# Patient Record
Sex: FEMALE | Race: WHITE
[De-identification: names, ages, dates, MRNs, and addresses within clinical notes are randomized per-mention and may not be internally consistent; named-entity substitution may affect disease eponyms.]

---

## 2017-09-29 ENCOUNTER — HOSPITAL ENCOUNTER (EMERGENCY)
Dept: HOSPITAL 83 - ED | Age: 22
Discharge: HOME | End: 2017-09-29
Payer: COMMERCIAL

## 2017-09-29 VITALS — HEIGHT: 55 IN | WEIGHT: 110 LBS

## 2017-09-29 DIAGNOSIS — L02.01: Primary | ICD-10-CM

## 2017-09-29 DIAGNOSIS — Z88.8: ICD-10-CM

## 2018-01-17 ENCOUNTER — HOSPITAL ENCOUNTER (EMERGENCY)
Dept: HOSPITAL 83 - ED | Age: 23
Discharge: HOME | End: 2018-01-17
Payer: COMMERCIAL

## 2018-01-17 VITALS — WEIGHT: 100 LBS | HEIGHT: 60 IN | BODY MASS INDEX: 19.63 KG/M2

## 2018-01-17 DIAGNOSIS — Z88.8: ICD-10-CM

## 2018-01-17 DIAGNOSIS — K52.9: Primary | ICD-10-CM

## 2018-01-17 LAB
ALBUMIN SERPL-MCNC: 3.9 GM/DL (ref 3.1–4.5)
ALP SERPL-CCNC: 95 U/L (ref 45–117)
ALT SERPL W P-5'-P-CCNC: 30 U/L (ref 12–78)
AST SERPL-CCNC: 27 IU/L (ref 3–35)
BASOPHILS # BLD AUTO: 0 10*3/UL (ref 0–0.1)
BASOPHILS NFR BLD AUTO: 0.4 % (ref 0–1)
BUN SERPL-MCNC: 22 MG/DL (ref 7–24)
CHLORIDE SERPL-SCNC: 104 MMOL/L (ref 98–107)
CREAT SERPL-MCNC: 0.8 MG/DL (ref 0.55–1.02)
EOSINOPHIL # BLD AUTO: 0.2 10*3/UL (ref 0–0.4)
EOSINOPHIL # BLD AUTO: 2 % (ref 1–4)
ERYTHROCYTE [DISTWIDTH] IN BLOOD BY AUTOMATED COUNT: 12.3 % (ref 0–14.5)
HCT VFR BLD AUTO: 39.7 % (ref 37–47)
HGB BLD-MCNC: 13.6 G/DL (ref 12–16)
LIPASE SERPL-CCNC: 80 U/L (ref 73–393)
LYMPHOCYTES # BLD AUTO: 1.6 10*3/UL (ref 1.3–4.4)
LYMPHOCYTES NFR BLD AUTO: 18.2 % (ref 27–41)
MCH RBC QN AUTO: 30.8 PG (ref 27–31)
MCHC RBC AUTO-ENTMCNC: 34.3 G/DL (ref 33–37)
MCV RBC AUTO: 90 FL (ref 81–99)
MONOCYTES # BLD AUTO: 0.9 10*3/UL (ref 0.1–1)
MONOCYTES NFR BLD MANUAL: 10.4 % (ref 3–9)
NEUT #: 5.9 10*3/UL (ref 2.3–7.9)
NEUT %: 68.6 % (ref 47–73)
NRBC BLD QL AUTO: 0 10*3/UL (ref 0–0)
PLATELET # BLD AUTO: 235 10*3/UL (ref 130–400)
PMV BLD AUTO: 9.3 FL (ref 9.6–12.3)
POTASSIUM SERPL-SCNC: 4.1 MMOL/L (ref 3.5–5.1)
PROT SERPL-MCNC: 7.5 GM/DL (ref 6.4–8.2)
RBC # BLD AUTO: 4.41 10*6/UL (ref 4.1–5.1)
SODIUM SERPL-SCNC: 139 MMOL/L (ref 136–145)
WBC NRBC COR # BLD AUTO: 8.6 10*3/UL (ref 4.8–10.8)

## 2018-05-23 ENCOUNTER — HOSPITAL ENCOUNTER (EMERGENCY)
Dept: HOSPITAL 83 - ED | Age: 23
Discharge: HOME | End: 2018-05-23
Payer: COMMERCIAL

## 2018-05-23 VITALS — BODY MASS INDEX: 20.62 KG/M2 | HEIGHT: 60 IN | WEIGHT: 105 LBS

## 2018-05-23 DIAGNOSIS — B34.9: Primary | ICD-10-CM

## 2018-05-23 DIAGNOSIS — R53.1: ICD-10-CM

## 2018-05-23 DIAGNOSIS — Z88.8: ICD-10-CM

## 2018-05-23 DIAGNOSIS — R42: ICD-10-CM

## 2018-05-23 LAB
APPEARANCE UR: CLEAR
BACTERIA #/AREA URNS HPF: (no result) /[HPF]
BILIRUB UR QL STRIP: NEGATIVE
COLOR UR: YELLOW
GLUCOSE UR QL: NEGATIVE
HGB UR QL STRIP: NEGATIVE
KETONES UR QL STRIP: NEGATIVE
LEUKOCYTE ESTERASE UR QL STRIP: NEGATIVE
NITRITE UR QL STRIP: NEGATIVE
PH UR STRIP: 5.5 [PH] (ref 5–9)
RBC #/AREA URNS HPF: (no result) RBC/HPF (ref 0–2)
SP GR UR: 1.01 (ref 1–1.03)
UROBILINOGEN UR STRIP-MCNC: 0.2 E.U./DL (ref 0.2–1)
WBC #/AREA URNS HPF: (no result) WBC/HPF (ref 0–5)

## 2018-06-18 ENCOUNTER — HOSPITAL ENCOUNTER (EMERGENCY)
Dept: HOSPITAL 83 - ED | Age: 23
Discharge: HOME | End: 2018-06-18
Payer: COMMERCIAL

## 2018-06-18 VITALS — BODY MASS INDEX: 21.4 KG/M2 | WEIGHT: 109 LBS | HEIGHT: 60 IN

## 2018-06-18 DIAGNOSIS — L30.8: Primary | ICD-10-CM

## 2018-06-18 DIAGNOSIS — Z88.8: ICD-10-CM

## 2018-07-14 ENCOUNTER — HOSPITAL ENCOUNTER (EMERGENCY)
Dept: HOSPITAL 83 - ED | Age: 23
Discharge: HOME | End: 2018-07-14
Payer: COMMERCIAL

## 2018-07-14 VITALS — HEIGHT: 55 IN | WEIGHT: 109 LBS

## 2018-07-14 DIAGNOSIS — Z88.8: ICD-10-CM

## 2018-07-14 DIAGNOSIS — L30.9: Primary | ICD-10-CM

## 2018-07-14 DIAGNOSIS — Z79.899: ICD-10-CM

## 2019-03-18 ENCOUNTER — HOSPITAL ENCOUNTER (EMERGENCY)
Dept: HOSPITAL 83 - ED | Age: 24
Discharge: HOME | End: 2019-03-18
Payer: COMMERCIAL

## 2019-03-18 VITALS — HEIGHT: 60 IN | WEIGHT: 114 LBS | BODY MASS INDEX: 22.38 KG/M2

## 2019-03-18 DIAGNOSIS — Z88.8: ICD-10-CM

## 2019-03-18 DIAGNOSIS — R20.0: ICD-10-CM

## 2019-03-18 DIAGNOSIS — B34.9: ICD-10-CM

## 2019-03-18 DIAGNOSIS — R10.13: Primary | ICD-10-CM

## 2019-03-18 LAB
ALBUMIN SERPL-MCNC: 4.1 GM/DL (ref 3.1–4.5)
ALP SERPL-CCNC: 98 U/L (ref 45–117)
ALT SERPL W P-5'-P-CCNC: 21 U/L (ref 12–78)
APTT PPP: 25 SECONDS (ref 20.8–31.5)
AST SERPL-CCNC: 11 IU/L (ref 3–35)
BASOPHILS # BLD AUTO: 0 10*3/UL (ref 0–0.1)
BASOPHILS NFR BLD AUTO: 0.1 % (ref 0–1)
BUN SERPL-MCNC: 17 MG/DL (ref 7–24)
CHLORIDE SERPL-SCNC: 104 MMOL/L (ref 98–107)
CREAT SERPL-MCNC: 0.66 MG/DL (ref 0.55–1.02)
EOSINOPHIL # BLD AUTO: 0.1 10*3/UL (ref 0–0.4)
EOSINOPHIL # BLD AUTO: 1 % (ref 1–4)
ERYTHROCYTE [DISTWIDTH] IN BLOOD BY AUTOMATED COUNT: 12.1 % (ref 0–14.5)
HCT VFR BLD AUTO: 39.7 % (ref 37–47)
HGB BLD-MCNC: 13.5 G/DL (ref 12–16)
INR BLD: 1.1 (ref 2–3.5)
LIPASE SERPL-CCNC: 78 U/L (ref 73–393)
LYMPHOCYTES # BLD AUTO: 0.9 10*3/UL (ref 1.3–4.4)
LYMPHOCYTES NFR BLD AUTO: 9.7 % (ref 27–41)
MCH RBC QN AUTO: 30.4 PG (ref 27–31)
MCHC RBC AUTO-ENTMCNC: 34 G/DL (ref 33–37)
MCV RBC AUTO: 89.4 FL (ref 81–99)
MONOCYTES # BLD AUTO: 1.1 10*3/UL (ref 0.1–1)
MONOCYTES NFR BLD MANUAL: 10.9 % (ref 3–9)
NEUT #: 7.6 10*3/UL (ref 2.3–7.9)
NEUT %: 78.1 % (ref 47–73)
NRBC BLD QL AUTO: 0 % (ref 0–0)
PLATELET # BLD AUTO: 189 10*3/UL (ref 130–400)
PMV BLD AUTO: 9.6 FL (ref 9.6–12.3)
POTASSIUM SERPL-SCNC: 4 MMOL/L (ref 3.5–5.1)
PROT SERPL-MCNC: 8 GM/DL (ref 6.4–8.2)
RBC # BLD AUTO: 4.44 10*6/UL (ref 4.1–5.1)
SODIUM SERPL-SCNC: 138 MMOL/L (ref 136–145)
TROPONIN I SERPL-MCNC: < 0.015 NG/ML (ref ?–0.04)
WBC NRBC COR # BLD AUTO: 9.7 10*3/UL (ref 4.8–10.8)

## 2019-12-20 ENCOUNTER — HOSPITAL ENCOUNTER (EMERGENCY)
Dept: HOSPITAL 83 - ED | Age: 24
Discharge: HOME | End: 2019-12-20
Payer: COMMERCIAL

## 2019-12-20 VITALS — BODY MASS INDEX: 23.56 KG/M2 | WEIGHT: 120 LBS | HEIGHT: 60 IN

## 2019-12-20 DIAGNOSIS — Z87.442: ICD-10-CM

## 2019-12-20 DIAGNOSIS — J10.1: Primary | ICD-10-CM

## 2019-12-20 DIAGNOSIS — Z88.8: ICD-10-CM

## 2020-08-14 ENCOUNTER — HOSPITAL ENCOUNTER (EMERGENCY)
Dept: HOSPITAL 83 - ED | Age: 25
Discharge: HOME | End: 2020-08-14
Payer: COMMERCIAL

## 2020-08-14 VITALS — HEIGHT: 55 IN

## 2020-08-14 DIAGNOSIS — F41.0: Primary | ICD-10-CM

## 2020-08-14 DIAGNOSIS — Z88.8: ICD-10-CM

## 2020-10-13 ENCOUNTER — HOSPITAL ENCOUNTER (EMERGENCY)
Dept: HOSPITAL 83 - ED | Age: 25
Discharge: HOME | End: 2020-10-13
Payer: COMMERCIAL

## 2020-10-13 VITALS — HEIGHT: 65.98 IN | BODY MASS INDEX: 21.28 KG/M2 | WEIGHT: 132.38 LBS

## 2020-10-13 DIAGNOSIS — J01.80: Primary | ICD-10-CM

## 2020-10-13 DIAGNOSIS — Z88.8: ICD-10-CM

## 2021-03-08 ENCOUNTER — HOSPITAL ENCOUNTER (OUTPATIENT)
Dept: HOSPITAL 83 - RAD | Age: 26
Discharge: HOME | End: 2021-03-08
Attending: NURSE PRACTITIONER
Payer: COMMERCIAL

## 2021-03-08 DIAGNOSIS — M25.531: Primary | ICD-10-CM

## 2021-03-14 ENCOUNTER — HOSPITAL ENCOUNTER (EMERGENCY)
Dept: HOSPITAL 83 - ED | Age: 26
Discharge: HOME | End: 2021-03-14
Payer: COMMERCIAL

## 2021-03-14 VITALS — BODY MASS INDEX: 24.94 KG/M2 | HEIGHT: 60 IN | WEIGHT: 127 LBS

## 2021-03-14 DIAGNOSIS — Z87.442: ICD-10-CM
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
## 2021-03-14 DIAGNOSIS — S02.32XA: Primary | ICD-10-CM

## 2021-03-14 DIAGNOSIS — Z79.899: ICD-10-CM

## 2021-03-14 DIAGNOSIS — Z88.8: ICD-10-CM
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
## 2021-03-14 DIAGNOSIS — H11.32: ICD-10-CM

## 2021-03-15 ENCOUNTER — TELEPHONE (OUTPATIENT)
Dept: ENT CLINIC | Age: 26
End: 2021-03-15
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
## 2021-03-17 ENCOUNTER — OFFICE VISIT (OUTPATIENT)
Dept: ENT CLINIC | Age: 26
End: 2021-03-17
Payer: COMMERCIAL

## 2021-03-17 VITALS
HEART RATE: 84 BPM | WEIGHT: 127 LBS | HEIGHT: 60 IN | BODY MASS INDEX: 24.94 KG/M2 | SYSTOLIC BLOOD PRESSURE: 119 MMHG | DIASTOLIC BLOOD PRESSURE: 63 MMHG | OXYGEN SATURATION: 99 % | TEMPERATURE: 97.4 F

## 2021-03-17 DIAGNOSIS — S02.32XA CLOSED BLOW-OUT FRACTURE OF LEFT ORBITAL FLOOR (HCC): Primary | ICD-10-CM

## 2021-03-17 PROCEDURE — 99204 OFFICE O/P NEW MOD 45 MIN: CPT | Performed by: OTOLARYNGOLOGY

## 2021-03-17 RX ORDER — HYDROCODONE BITARTRATE AND ACETAMINOPHEN 5; 325 MG/1; MG/1
TABLET ORAL
COMMUNITY
Start: 2021-03-15

## 2021-03-17 RX ORDER — AMOXICILLIN AND CLAVULANATE POTASSIUM 875; 125 MG/1; MG/1
TABLET, FILM COATED ORAL
COMMUNITY
Start: 2021-03-15

## 2021-03-17 ASSESSMENT — ENCOUNTER SYMPTOMS
EYE PAIN: 0
EYES NEGATIVE: 1
GASTROINTESTINAL NEGATIVE: 1
SORE THROAT: 1
DIARRHEA: 0
APNEA: 0
EYE DISCHARGE: 1
ABDOMINAL PAIN: 0
TROUBLE SWALLOWING: 1
RESPIRATORY NEGATIVE: 1
EYE DISCHARGE: 0
RHINORRHEA: 1
EYE PAIN: 1
CHEST TIGHTNESS: 0
COLOR CHANGE: 0
SHORTNESS OF BREATH: 0
VOMITING: 0
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
## 2021-03-17 NOTE — PROGRESS NOTES
Subjective:      Patient ID:  Maral Barahona is a 27 y.o. female. HPI:    Patient presents today for recheck of tonsils. Condition has been present for 1 day ago. Pt started to have ear issues last night and has been getting worse since then. History reviewed. No pertinent past medical history. Past Surgical History:   Procedure Laterality Date     SECTION      x2    TUBAL LIGATION       History reviewed. No pertinent family history. Social History     Socioeconomic History    Marital status: Single     Spouse name: None    Number of children: None    Years of education: None    Highest education level: None   Occupational History    None   Social Needs    Financial resource strain: None    Food insecurity     Worry: None     Inability: None    Transportation needs     Medical: None     Non-medical: None   Tobacco Use    Smoking status: Never Smoker    Smokeless tobacco: Never Used   Substance and Sexual Activity    Alcohol use: Not Currently    Drug use: Not Currently    Sexual activity: None   Lifestyle    Physical activity     Days per week: None     Minutes per session: None    Stress: None   Relationships    Social connections     Talks on phone: None     Gets together: None     Attends U[employer] service: None     Active member of club or organization: None     Attends meetings of clubs or organizations: None     Relationship status: None    Intimate partner violence     Fear of current or ex partner: None     Emotionally abused: None     Physically abused: None     Forced sexual activity: None   Other Topics Concern    None   Social History Narrative    None     Allergies   Allergen Reactions    Rondec-D [Chlophedianol-Pseudoephedrine]        Review of Systems   Constitutional: Negative. Negative for appetite change. HENT: Positive for sore throat and trouble swallowing. Eyes: Negative. Negative for pain, discharge and visual disturbance. Respiratory: Negative. Negative for apnea, chest tightness and shortness of breath. Cardiovascular: Negative. Negative for chest pain, palpitations and leg swelling. Gastrointestinal: Negative. Negative for abdominal pain, diarrhea and vomiting. Endocrine: Negative for cold intolerance, heat intolerance and polydipsia. Genitourinary: Negative. Negative for dysuria, flank pain and hematuria. Musculoskeletal: Negative. Negative for arthralgias, gait problem and neck pain. Skin: Negative. Negative for color change, pallor and rash. Allergic/Immunologic: Negative for environmental allergies, food allergies and immunocompromised state. Neurological: Negative. Negative for dizziness, numbness and headaches. Hematological: Negative for adenopathy. Psychiatric/Behavioral: Negative. Negative for behavioral problems and hallucinations. All other systems reviewed and are negative. Objective:     Vitals:    03/17/21 1516   BP: 119/63   Pulse: 84   Temp: 97.4 °F (36.3 °C)   SpO2: 99%     Physical Exam  Vitals signs and nursing note reviewed. Constitutional:       Appearance: She is well-developed. HENT:      Head: Normocephalic and atraumatic. Right Ear: Hearing, tympanic membrane, ear canal and external ear normal.      Left Ear: Hearing, tympanic membrane, ear canal and external ear normal.      Nose: Nose normal.      Mouth/Throat:      Lips: Pink. Mouth: Mucous membranes are moist.      Tonsils: Tonsillar exudate present. 3+ on the right. 3+ on the left. Eyes:      Conjunctiva/sclera: Conjunctivae normal.      Pupils: Pupils are equal, round, and reactive to light. Neck:      Musculoskeletal: Normal range of motion and neck supple. Cardiovascular:      Rate and Rhythm: Normal rate and regular rhythm. Heart sounds: Normal heart sounds. Pulmonary:      Effort: Pulmonary effort is normal.      Breath sounds: Normal breath sounds.    Abdominal:      General: Bowel sounds are normal. Palpations: Abdomen is soft. Skin:     General: Skin is warm and dry. Neurological:      Mental Status: She is alert and oriented to person, place, and time. Assessment:       Diagnosis Orders   1.  Tonsillitis with exudate                Plan:      ***  Follow up {follow up:82514::\"as scheduled\",\"1 week after surgery\",\"2 weeks after surgery\"}

## 2021-05-03 ENCOUNTER — HOSPITAL ENCOUNTER (EMERGENCY)
Dept: HOSPITAL 83 - ED | Age: 26
Discharge: HOME | End: 2021-05-03
Payer: COMMERCIAL

## 2021-05-03 VITALS — WEIGHT: 120 LBS | BODY MASS INDEX: 23.56 KG/M2 | HEIGHT: 60 IN

## 2021-05-03 DIAGNOSIS — Z98.890: ICD-10-CM

## 2021-05-03 DIAGNOSIS — Z88.8: ICD-10-CM

## 2021-05-03 DIAGNOSIS — G43.909: Primary | ICD-10-CM

## 2021-05-03 DIAGNOSIS — Z79.899: ICD-10-CM

## 2021-11-04 ENCOUNTER — HOSPITAL ENCOUNTER (EMERGENCY)
Dept: HOSPITAL 83 - ED | Age: 26
Discharge: HOME | End: 2021-11-04
Payer: COMMERCIAL

## 2021-11-04 VITALS — WEIGHT: 125 LBS | HEIGHT: 55 IN

## 2021-11-04 VITALS — HEIGHT: 60 IN | BODY MASS INDEX: 24.54 KG/M2 | WEIGHT: 125 LBS

## 2021-11-04 DIAGNOSIS — G43.909: Primary | ICD-10-CM

## 2021-11-04 DIAGNOSIS — T78.3XXA: Primary | ICD-10-CM
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
## 2021-11-04 DIAGNOSIS — Z88.8: ICD-10-CM

## 2021-12-15 ENCOUNTER — HOSPITAL ENCOUNTER (EMERGENCY)
Dept: HOSPITAL 83 - ED | Age: 26
LOS: 1 days | Discharge: HOME | End: 2021-12-16
Payer: COMMERCIAL

## 2021-12-15 VITALS — BODY MASS INDEX: 23.32 KG/M2 | WEIGHT: 140 LBS | HEIGHT: 65 IN
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
## 2021-12-15 DIAGNOSIS — S61.552A: Primary | ICD-10-CM
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
## 2021-12-15 DIAGNOSIS — Z88.8: ICD-10-CM

## 2022-01-13 ENCOUNTER — HOSPITAL ENCOUNTER (OUTPATIENT)
Dept: HOSPITAL 83 - LAB | Age: 27
Discharge: HOME | End: 2022-01-13
Attending: NURSE PRACTITIONER
Payer: COMMERCIAL

## 2022-01-13 DIAGNOSIS — L70.0: Primary | ICD-10-CM

## 2022-01-13 LAB
ALBUMIN SERPL-MCNC: 3.8 GM/DL (ref 3.1–4.5)
ALP SERPL-CCNC: 86 U/L (ref 45–117)
ALT SERPL W P-5'-P-CCNC: 19 U/L (ref 12–78)
AST SERPL-CCNC: 11 IU/L (ref 3–35)
BASOPHILS # BLD AUTO: 0 10*3/UL (ref 0–0.1)
BASOPHILS NFR BLD AUTO: 0.2 % (ref 0–1)
BUN SERPL-MCNC: 13 MG/DL (ref 7–24)
CHLORIDE SERPL-SCNC: 108 MMOL/L (ref 98–107)
CHOLEST SERPL-MCNC: 136 MG/DL (ref ?–200)
CREAT SERPL-MCNC: 0.6 MG/DL (ref 0.55–1.02)
EOSINOPHIL # BLD AUTO: 0.1 10*3/UL (ref 0–0.4)
EOSINOPHIL # BLD AUTO: 1 % (ref 1–4)
ERYTHROCYTE [DISTWIDTH] IN BLOOD BY AUTOMATED COUNT: 14 % (ref 0–14.5)
HCT VFR BLD AUTO: 34.1 % (ref 37–47)
LDLC SERPL DIRECT ASSAY-MCNC: 83 MG/DL (ref 9–159)
LYMPHOCYTES # BLD AUTO: 1.4 10*3/UL (ref 1.3–4.4)
LYMPHOCYTES NFR BLD AUTO: 23.7 % (ref 27–41)
MCH RBC QN AUTO: 28.2 PG (ref 27–31)
MCHC RBC AUTO-ENTMCNC: 32.6 G/DL (ref 33–37)
MCV RBC AUTO: 86.5 FL (ref 81–99)
MONOCYTES # BLD AUTO: 0.6 10*3/UL (ref 0.1–1)
MONOCYTES NFR BLD MANUAL: 10.3 % (ref 3–9)
NEUT #: 3.8 10*3/UL (ref 2.3–7.9)
NEUT %: 64.6 % (ref 47–73)
NRBC BLD QL AUTO: 0 10*3/UL (ref 0–0)
PLATELET # BLD AUTO: 228 10*3/UL (ref 130–400)
PMV BLD AUTO: 10.2 FL (ref 9.6–12.3)
POTASSIUM SERPL-SCNC: 3.3 MMOL/L (ref 3.5–5.1)
PROT SERPL-MCNC: 6.9 GM/DL (ref 6.4–8.2)
RBC # BLD AUTO: 3.94 10*6/UL (ref 4.1–5.1)
SODIUM SERPL-SCNC: 140 MMOL/L (ref 136–145)
TRIGL SERPL-MCNC: 31 MG/DL (ref ?–150)
WBC NRBC COR # BLD AUTO: 5.9 10*3/UL (ref 4.8–10.8)

## 2022-02-21 ENCOUNTER — HOSPITAL ENCOUNTER (OUTPATIENT)
Dept: HOSPITAL 83 - LAB | Age: 27
Discharge: HOME | End: 2022-02-21
Payer: COMMERCIAL

## 2022-02-21 DIAGNOSIS — L70.8: Primary | ICD-10-CM

## 2022-02-21 LAB
ALBUMIN SERPL-MCNC: 3.7 GM/DL (ref 3.1–4.5)
ALP SERPL-CCNC: 102 U/L (ref 45–117)
ALT SERPL W P-5'-P-CCNC: 23 U/L (ref 12–78)
AST SERPL-CCNC: 10 IU/L (ref 3–35)
B-HCG SERPL-ACNC: NEGATIVE
BASOPHILS # BLD AUTO: 0 10*3/UL (ref 0–0.1)
BASOPHILS NFR BLD AUTO: 0.2 % (ref 0–1)
BUN SERPL-MCNC: 24 MG/DL (ref 7–24)
CHLORIDE SERPL-SCNC: 108 MMOL/L (ref 98–107)
CHOLEST SERPL-MCNC: 149 MG/DL (ref ?–200)
CREAT SERPL-MCNC: 0.65 MG/DL (ref 0.55–1.02)
EOSINOPHIL # BLD AUTO: 0.1 10*3/UL (ref 0–0.4)
EOSINOPHIL # BLD AUTO: 1.1 % (ref 1–4)
ERYTHROCYTE [DISTWIDTH] IN BLOOD BY AUTOMATED COUNT: 14.5 % (ref 0–14.5)
HCT VFR BLD AUTO: 36.1 % (ref 37–47)
LDLC SERPL DIRECT ASSAY-MCNC: 92 MG/DL (ref 9–159)
LYMPHOCYTES # BLD AUTO: 2.4 10*3/UL (ref 1.3–4.4)
LYMPHOCYTES NFR BLD AUTO: 42.1 % (ref 27–41)
MCH RBC QN AUTO: 26.9 PG (ref 27–31)
MCHC RBC AUTO-ENTMCNC: 31.6 G/DL (ref 33–37)
MCV RBC AUTO: 85.1 FL (ref 81–99)
MONOCYTES # BLD AUTO: 0.7 10*3/UL (ref 0.1–1)
MONOCYTES NFR BLD MANUAL: 12.9 % (ref 3–9)
NEUT #: 2.5 10*3/UL (ref 2.3–7.9)
NEUT %: 43.5 % (ref 47–73)
NRBC BLD QL AUTO: 0 % (ref 0–0)
PLATELET # BLD AUTO: 276 10*3/UL (ref 130–400)
PMV BLD AUTO: 9.8 FL (ref 9.6–12.3)
POTASSIUM SERPL-SCNC: 3.8 MMOL/L (ref 3.5–5.1)
PROT SERPL-MCNC: 7.1 GM/DL (ref 6.4–8.2)
RBC # BLD AUTO: 4.24 10*6/UL (ref 4.1–5.1)
SODIUM SERPL-SCNC: 140 MMOL/L (ref 136–145)
TRIGL SERPL-MCNC: 77 MG/DL (ref ?–150)
WBC NRBC COR # BLD AUTO: 5.7 10*3/UL (ref 4.8–10.8)

## 2022-03-29 ENCOUNTER — HOSPITAL ENCOUNTER (OUTPATIENT)
Dept: HOSPITAL 83 - LAB | Age: 27
Discharge: HOME | End: 2022-03-29
Attending: NURSE PRACTITIONER
Payer: COMMERCIAL

## 2022-03-29 ENCOUNTER — HOSPITAL ENCOUNTER (EMERGENCY)
Dept: HOSPITAL 83 - ED | Age: 27
Discharge: HOME | End: 2022-03-29
Payer: COMMERCIAL

## 2022-03-29 VITALS — WEIGHT: 125 LBS | HEIGHT: 55 IN

## 2022-03-29 DIAGNOSIS — Z79.899: ICD-10-CM

## 2022-03-29 DIAGNOSIS — Z88.8: ICD-10-CM

## 2022-03-29 DIAGNOSIS — Z98.51: ICD-10-CM
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
## 2022-03-29 DIAGNOSIS — R10.9: ICD-10-CM

## 2022-03-29 DIAGNOSIS — R07.89: Primary | ICD-10-CM

## 2022-03-29 DIAGNOSIS — L70.0: Primary | ICD-10-CM

## 2022-03-29 DIAGNOSIS — M25.562: ICD-10-CM

## 2022-03-29 DIAGNOSIS — Z79.2: ICD-10-CM
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
## 2022-03-29 DIAGNOSIS — Z98.890: ICD-10-CM

## 2022-03-29 LAB
ALP SERPL-CCNC: 114 U/L (ref 45–117)
ALT SERPL W P-5'-P-CCNC: 32 U/L (ref 12–78)
AST SERPL-CCNC: 28 IU/L (ref 3–35)
BASOPHILS # BLD AUTO: 0 10*3/UL (ref 0–0.1)
BASOPHILS # BLD AUTO: 0 10*3/UL (ref 0–0.1)
BASOPHILS NFR BLD AUTO: 0.1 % (ref 0–1)
BASOPHILS NFR BLD AUTO: 0.3 % (ref 0–1)
BUN SERPL-MCNC: 17 MG/DL (ref 7–24)
BUN SERPL-MCNC: 23 MG/DL (ref 7–24)
CHLORIDE SERPL-SCNC: 108 MMOL/L (ref 98–107)
CHLORIDE SERPL-SCNC: 109 MMOL/L (ref 98–107)
CHOLEST SERPL-MCNC: 177 MG/DL (ref ?–200)
CREAT SERPL-MCNC: 0.64 MG/DL (ref 0.55–1.02)
CREAT SERPL-MCNC: 0.91 MG/DL (ref 0.55–1.02)
EOSINOPHIL # BLD AUTO: 0.1 10*3/UL (ref 0–0.4)
EOSINOPHIL # BLD AUTO: 0.1 10*3/UL (ref 0–0.4)
EOSINOPHIL # BLD AUTO: 1.1 % (ref 1–4)
EOSINOPHIL # BLD AUTO: 1.2 % (ref 1–4)
ERYTHROCYTE [DISTWIDTH] IN BLOOD BY AUTOMATED COUNT: 13.5 % (ref 0–14.5)
ERYTHROCYTE [DISTWIDTH] IN BLOOD BY AUTOMATED COUNT: 13.6 % (ref 0–14.5)
HCT VFR BLD AUTO: 35.6 % (ref 37–47)
HCT VFR BLD AUTO: 35.9 % (ref 37–47)
LDLC SERPL DIRECT ASSAY-MCNC: 118 MG/DL (ref 9–159)
LYMPHOCYTES # BLD AUTO: 1.9 10*3/UL (ref 1.3–4.4)
LYMPHOCYTES # BLD AUTO: 2 10*3/UL (ref 1.3–4.4)
LYMPHOCYTES NFR BLD AUTO: 24.6 % (ref 27–41)
LYMPHOCYTES NFR BLD AUTO: 29.5 % (ref 27–41)
MCH RBC QN AUTO: 27.1 PG (ref 27–31)
MCH RBC QN AUTO: 27.6 PG (ref 27–31)
MCHC RBC AUTO-ENTMCNC: 32 G/DL (ref 33–37)
MCHC RBC AUTO-ENTMCNC: 32.9 G/DL (ref 33–37)
MCV RBC AUTO: 83.9 FL (ref 81–99)
MCV RBC AUTO: 84.6 FL (ref 81–99)
MONOCYTES # BLD AUTO: 0.7 10*3/UL (ref 0.1–1)
MONOCYTES # BLD AUTO: 0.8 10*3/UL (ref 0.1–1)
MONOCYTES NFR BLD MANUAL: 10.6 % (ref 3–9)
MONOCYTES NFR BLD MANUAL: 10.7 % (ref 3–9)
NEUT #: 3.8 10*3/UL (ref 2.3–7.9)
NEUT #: 4.8 10*3/UL (ref 2.3–7.9)
NEUT %: 58 % (ref 47–73)
NEUT %: 63.2 % (ref 47–73)
NRBC BLD QL AUTO: 0 % (ref 0–0)
NRBC BLD QL AUTO: 0 % (ref 0–0)
PLATELET # BLD AUTO: 272 10*3/UL (ref 130–400)
PLATELET # BLD AUTO: 292 10*3/UL (ref 130–400)
PMV BLD AUTO: 9.2 FL (ref 9.6–12.3)
PMV BLD AUTO: 9.5 FL (ref 9.6–12.3)
POTASSIUM SERPL-SCNC: 3.8 MMOL/L (ref 3.5–5.1)
POTASSIUM SERPL-SCNC: 3.9 MMOL/L (ref 3.5–5.1)
PROT SERPL-MCNC: 7.4 GM/DL (ref 6.4–8.2)
RBC # BLD AUTO: 4.21 10*6/UL (ref 4.1–5.1)
RBC # BLD AUTO: 4.28 10*6/UL (ref 4.1–5.1)
SODIUM SERPL-SCNC: 141 MMOL/L (ref 136–145)
SODIUM SERPL-SCNC: 143 MMOL/L (ref 136–145)
TRIGL SERPL-MCNC: 96 MG/DL (ref ?–150)
WBC NRBC COR # BLD AUTO: 6.6 10*3/UL (ref 4.8–10.8)
WBC NRBC COR # BLD AUTO: 7.5 10*3/UL (ref 4.8–10.8)

## 2022-04-26 ENCOUNTER — HOSPITAL ENCOUNTER (OUTPATIENT)
Dept: HOSPITAL 83 - LAB | Age: 27
Discharge: HOME | End: 2022-04-26
Attending: NURSE PRACTITIONER
Payer: COMMERCIAL

## 2022-04-26 DIAGNOSIS — L70.0: Primary | ICD-10-CM

## 2022-04-26 LAB
ALP SERPL-CCNC: 109 U/L (ref 45–117)
ALT SERPL W P-5'-P-CCNC: 17 U/L (ref 12–78)
AST SERPL-CCNC: 18 IU/L (ref 3–35)
B-HCG SERPL-ACNC: < 1 MIU/ML (ref 1–3)
BASOPHILS # BLD AUTO: 0 10*3/UL (ref 0–0.1)
BASOPHILS NFR BLD AUTO: 0.1 % (ref 0–1)
CHOLEST SERPL-MCNC: 157 MG/DL (ref ?–200)
EOSINOPHIL # BLD AUTO: 0.1 10*3/UL (ref 0–0.4)
EOSINOPHIL # BLD AUTO: 1 % (ref 1–4)
ERYTHROCYTE [DISTWIDTH] IN BLOOD BY AUTOMATED COUNT: 14.1 % (ref 0–14.5)
HCT VFR BLD AUTO: 36.2 % (ref 37–47)
LDLC SERPL DIRECT ASSAY-MCNC: 99 MG/DL (ref 9–159)
LYMPHOCYTES # BLD AUTO: 1.9 10*3/UL (ref 1.3–4.4)
LYMPHOCYTES NFR BLD AUTO: 26.9 % (ref 27–41)
MCH RBC QN AUTO: 27.1 PG (ref 27–31)
MCHC RBC AUTO-ENTMCNC: 32.3 G/DL (ref 33–37)
MCV RBC AUTO: 83.8 FL (ref 81–99)
MONOCYTES # BLD AUTO: 0.9 10*3/UL (ref 0.1–1)
MONOCYTES NFR BLD MANUAL: 12 % (ref 3–9)
NEUT #: 4.2 10*3/UL (ref 2.3–7.9)
NEUT %: 59.7 % (ref 47–73)
NRBC BLD QL AUTO: 0 10*3/UL (ref 0–0)
PLATELET # BLD AUTO: 260 10*3/UL (ref 130–400)
PMV BLD AUTO: 9.4 FL (ref 9.6–12.3)
PROT SERPL-MCNC: 7 GM/DL (ref 6.4–8.2)
RBC # BLD AUTO: 4.32 10*6/UL (ref 4.1–5.1)
TRIGL SERPL-MCNC: 69 MG/DL (ref ?–150)
WBC NRBC COR # BLD AUTO: 7.1 10*3/UL (ref 4.8–10.8)

## 2022-04-27 LAB — LDL CHOLESTEROL (DIRECT): 107 MG/DL (ref 0–99)

## 2022-06-06 ENCOUNTER — HOSPITAL ENCOUNTER (OUTPATIENT)
Dept: HOSPITAL 83 - LAB | Age: 27
End: 2022-06-06
Attending: NURSE PRACTITIONER
Payer: COMMERCIAL

## 2022-06-06 DIAGNOSIS — L70.0: Primary | ICD-10-CM

## 2022-06-06 LAB
ALP SERPL-CCNC: 106 U/L (ref 45–117)
ALT SERPL W P-5'-P-CCNC: 19 U/L (ref 12–78)
AST SERPL-CCNC: 15 IU/L (ref 3–35)
B-HCG SERPL-ACNC: < 1 MIU/ML (ref 1–3)
BASOPHILS # BLD AUTO: 0 10*3/UL (ref 0–0.1)
BASOPHILS NFR BLD AUTO: 0.4 % (ref 0–1)
CHOLEST SERPL-MCNC: 165 MG/DL (ref ?–200)
EOSINOPHIL # BLD AUTO: 0.1 10*3/UL (ref 0–0.4)
EOSINOPHIL # BLD AUTO: 1.2 % (ref 1–4)
ERYTHROCYTE [DISTWIDTH] IN BLOOD BY AUTOMATED COUNT: 14.2 % (ref 0–14.5)
HCT VFR BLD AUTO: 38.2 % (ref 37–47)
LDLC SERPL DIRECT ASSAY-MCNC: 99 MG/DL (ref 9–159)
LYMPHOCYTES # BLD AUTO: 1.8 10*3/UL (ref 1.3–4.4)
LYMPHOCYTES NFR BLD AUTO: 31.8 % (ref 27–41)
MCH RBC QN AUTO: 28.1 PG (ref 27–31)
MCHC RBC AUTO-ENTMCNC: 33 G/DL (ref 33–37)
MCV RBC AUTO: 85.3 FL (ref 81–99)
MONOCYTES # BLD AUTO: 0.5 10*3/UL (ref 0.1–1)
MONOCYTES NFR BLD MANUAL: 9.1 % (ref 3–9)
NEUT #: 3.3 10*3/UL (ref 2.3–7.9)
NEUT %: 57.1 % (ref 47–73)
NRBC BLD QL AUTO: 0 % (ref 0–0)
PLATELET # BLD AUTO: 285 10*3/UL (ref 130–400)
PMV BLD AUTO: 9.6 FL (ref 9.6–12.3)
PROT SERPL-MCNC: 7.3 GM/DL (ref 6.4–8.2)
RBC # BLD AUTO: 4.48 10*6/UL (ref 4.1–5.1)
TRIGL SERPL-MCNC: 121 MG/DL (ref ?–150)
WBC NRBC COR # BLD AUTO: 5.7 10*3/UL (ref 4.8–10.8)

## 2022-06-07 LAB — LDL CHOLESTEROL (DIRECT): 111 MG/DL (ref 0–99)

## 2022-06-27 ENCOUNTER — HOSPITAL ENCOUNTER (EMERGENCY)
Dept: HOSPITAL 83 - ED | Age: 27
Discharge: HOME | End: 2022-06-27
Payer: COMMERCIAL

## 2022-06-27 VITALS — HEIGHT: 60 IN | WEIGHT: 130 LBS | BODY MASS INDEX: 25.52 KG/M2

## 2022-06-27 DIAGNOSIS — M54.50: Primary | ICD-10-CM

## 2022-06-27 DIAGNOSIS — Z88.8: ICD-10-CM

## 2022-06-27 LAB
BACTERIA #/AREA URNS HPF: (no result) /[HPF]
EPI CELLS #/AREA URNS HPF: (no result) /[HPF]
HGB UR QL STRIP: (no result)
MUCOUS THREADS URNS QL MICRO: (no result)
PH UR STRIP: 6 [PH] (ref 4.5–8)
RBC #/AREA URNS HPF: (no result) RBC/HPF (ref 0–2)
SP GR UR: 1.01 (ref 1–1.03)
UROBILINOGEN UR STRIP-MCNC: 0.2 E.U./DL (ref 0–1)

## 2022-07-08 ENCOUNTER — HOSPITAL ENCOUNTER (OUTPATIENT)
Dept: HOSPITAL 83 - LAB | Age: 27
Discharge: HOME | End: 2022-07-08
Attending: NURSE PRACTITIONER
Payer: COMMERCIAL

## 2022-07-08 DIAGNOSIS — L85.3: ICD-10-CM

## 2022-07-08 DIAGNOSIS — Z79.899: ICD-10-CM

## 2022-07-08 DIAGNOSIS — L70.0: Primary | ICD-10-CM

## 2022-07-08 LAB
ALP SERPL-CCNC: 111 U/L (ref 45–117)
ALT SERPL W P-5'-P-CCNC: 20 U/L (ref 12–78)
AST SERPL-CCNC: 18 IU/L (ref 3–35)
B-HCG SERPL-ACNC: < 1 MIU/ML (ref 1–3)
BASOPHILS # BLD AUTO: 0 10*3/UL (ref 0–0.1)
BASOPHILS NFR BLD AUTO: 0.2 % (ref 0–1)
CHOLEST SERPL-MCNC: 184 MG/DL (ref ?–200)
EOSINOPHIL # BLD AUTO: 0.1 10*3/UL (ref 0–0.4)
EOSINOPHIL # BLD AUTO: 0.9 % (ref 1–4)
ERYTHROCYTE [DISTWIDTH] IN BLOOD BY AUTOMATED COUNT: 14.4 % (ref 0–14.5)
HCT VFR BLD AUTO: 35.3 % (ref 37–47)
LDLC SERPL DIRECT ASSAY-MCNC: 114 MG/DL (ref 9–159)
LYMPHOCYTES # BLD AUTO: 2.5 10*3/UL (ref 1.3–4.4)
LYMPHOCYTES NFR BLD AUTO: 28.4 % (ref 27–41)
MCH RBC QN AUTO: 28.1 PG (ref 27–31)
MCHC RBC AUTO-ENTMCNC: 33.4 G/DL (ref 33–37)
MCV RBC AUTO: 84 FL (ref 81–99)
MONOCYTES # BLD AUTO: 1 10*3/UL (ref 0.1–1)
MONOCYTES NFR BLD MANUAL: 10.9 % (ref 3–9)
NEUT #: 5.3 10*3/UL (ref 2.3–7.9)
NEUT %: 59.5 % (ref 47–73)
NRBC BLD QL AUTO: 0 10*3/UL (ref 0–0)
PLATELET # BLD AUTO: 278 10*3/UL (ref 130–400)
PMV BLD AUTO: 9.5 FL (ref 9.6–12.3)
PROT SERPL-MCNC: 7.2 GM/DL (ref 6.4–8.2)
RBC # BLD AUTO: 4.2 10*6/UL (ref 4.1–5.1)
TRIGL SERPL-MCNC: 100 MG/DL (ref ?–150)
WBC NRBC COR # BLD AUTO: 8.9 10*3/UL (ref 4.8–10.8)

## 2022-08-15 ENCOUNTER — HOSPITAL ENCOUNTER (OUTPATIENT)
Dept: HOSPITAL 83 - LAB | Age: 27
Discharge: HOME | End: 2022-08-15
Attending: NURSE PRACTITIONER
Payer: COMMERCIAL

## 2022-08-15 DIAGNOSIS — Z79.899: ICD-10-CM

## 2022-08-15 DIAGNOSIS — L70.0: Primary | ICD-10-CM

## 2022-08-15 DIAGNOSIS — L85.3: ICD-10-CM

## 2022-08-15 LAB
ALP SERPL-CCNC: 123 U/L (ref 45–117)
ALT SERPL W P-5'-P-CCNC: 19 U/L (ref 12–78)
AST SERPL-CCNC: 17 IU/L (ref 3–35)
B-HCG SERPL-ACNC: < 1 MIU/ML (ref 1–3)
BASOPHILS # BLD AUTO: 0 10*3/UL (ref 0–0.1)
BASOPHILS NFR BLD AUTO: 0.3 % (ref 0–1)
CHOLEST SERPL-MCNC: 173 MG/DL (ref ?–200)
EOSINOPHIL # BLD AUTO: 0.1 10*3/UL (ref 0–0.4)
EOSINOPHIL # BLD AUTO: 0.8 % (ref 1–4)
ERYTHROCYTE [DISTWIDTH] IN BLOOD BY AUTOMATED COUNT: 13.9 % (ref 0–14.5)
HCT VFR BLD AUTO: 37 % (ref 37–47)
LDLC SERPL DIRECT ASSAY-MCNC: 93 MG/DL (ref 9–159)
LYMPHOCYTES # BLD AUTO: 2.5 10*3/UL (ref 1.3–4.4)
LYMPHOCYTES NFR BLD AUTO: 31.8 % (ref 27–41)
MCH RBC QN AUTO: 28.6 PG (ref 27–31)
MCHC RBC AUTO-ENTMCNC: 33.2 G/DL (ref 33–37)
MCV RBC AUTO: 86 FL (ref 81–99)
MONOCYTES # BLD AUTO: 1 10*3/UL (ref 0.1–1)
MONOCYTES NFR BLD MANUAL: 12 % (ref 3–9)
NEUT #: 4.4 10*3/UL (ref 2.3–7.9)
NEUT %: 54.8 % (ref 47–73)
NRBC BLD QL AUTO: 0 10*3/UL (ref 0–0)
PLATELET # BLD AUTO: 248 10*3/UL (ref 130–400)
PMV BLD AUTO: 10 FL (ref 9.6–12.3)
PROT SERPL-MCNC: 7.3 GM/DL (ref 6.4–8.2)
RBC # BLD AUTO: 4.3 10*6/UL (ref 4.1–5.1)
TRIGL SERPL-MCNC: 226 MG/DL (ref ?–150)
WBC NRBC COR # BLD AUTO: 8 10*3/UL (ref 4.8–10.8)

## 2024-06-28 ENCOUNTER — HOSPITAL ENCOUNTER (OUTPATIENT)
Dept: HOSPITAL 83 - LAB | Age: 29
Discharge: HOME | End: 2024-06-28
Attending: NURSE PRACTITIONER
Payer: COMMERCIAL

## 2024-06-28 DIAGNOSIS — R23.3: Primary | ICD-10-CM

## 2024-06-28 LAB
ALP SERPL-CCNC: 69 U/L (ref 46–116)
ALT SERPL W P-5'-P-CCNC: 10 U/L (ref 5–49)
APTT PPP: 25.4 SECONDS (ref 20–32.1)
BASOPHILS # BLD AUTO: 0 10*3/UL (ref 0–0.1)
BASOPHILS NFR BLD AUTO: 0.3 % (ref 0–1)
BUN SERPL-MCNC: 16 MG/DL (ref 9–23)
CHLORIDE SERPL-SCNC: 108 MMOL/L (ref 98–107)
EOSINOPHIL # BLD AUTO: 0.1 10*3/UL (ref 0–0.4)
EOSINOPHIL # BLD AUTO: 1.2 % (ref 1–4)
ERYTHROCYTE [DISTWIDTH] IN BLOOD BY AUTOMATED COUNT: 13.2 % (ref 0–14.5)
HCT VFR BLD AUTO: 37.5 % (ref 37–47)
LYMPHOCYTES # BLD AUTO: 2.2 10*3/UL (ref 1.3–4.4)
LYMPHOCYTES NFR BLD AUTO: 29.2 % (ref 27–41)
MCH RBC QN AUTO: 30.6 PG (ref 27–31)
MCHC RBC AUTO-ENTMCNC: 33.1 G/DL (ref 33–37)
MCV RBC AUTO: 92.6 FL (ref 81–99)
MONOCYTES # BLD AUTO: 0.6 10*3/UL (ref 0.1–1)
MONOCYTES NFR BLD MANUAL: 8.3 % (ref 3–9)
NEUT #: 4.6 10*3/UL (ref 2.3–7.9)
NEUT %: 60.9 % (ref 47–73)
NRBC BLD QL AUTO: 0 10*3/UL (ref 0–0)
PLATELET # BLD AUTO: 264 10*3/UL (ref 130–400)
PMV BLD AUTO: 9.4 FL (ref 9.6–12.3)
POTASSIUM SERPL-SCNC: 3.3 MMOL/L (ref 3.4–5.1)
PROT SERPL-MCNC: 6.8 GM/DL (ref 6–8)
RBC # BLD AUTO: 4.05 10*6/UL (ref 4.1–5.1)
WBC NRBC COR # BLD AUTO: 7.5 10*3/UL (ref 4.8–10.8)